# Patient Record
Sex: FEMALE | Race: WHITE | Employment: PART TIME | URBAN - METROPOLITAN AREA
[De-identification: names, ages, dates, MRNs, and addresses within clinical notes are randomized per-mention and may not be internally consistent; named-entity substitution may affect disease eponyms.]

---

## 2017-01-26 ENCOUNTER — HOSPITAL ENCOUNTER (OUTPATIENT)
Dept: MAMMOGRAPHY | Age: 42
Discharge: HOME | End: 2017-01-26
Attending: SPECIALIST
Payer: COMMERCIAL

## 2017-01-26 DIAGNOSIS — Z12.31 ENCOUNTER FOR MAMMOGRAM TO ESTABLISH BASELINE MAMMOGRAM: ICD-10-CM

## 2017-01-26 PROCEDURE — 77067 SCR MAMMO BI INCL CAD: CPT

## 2018-10-23 ENCOUNTER — OFFICE VISIT (OUTPATIENT)
Dept: FAMILY MEDICINE CLINIC | Age: 43
End: 2018-10-23

## 2018-10-23 VITALS
OXYGEN SATURATION: 99 % | RESPIRATION RATE: 16 BRPM | BODY MASS INDEX: 35.32 KG/M2 | HEIGHT: 65 IN | TEMPERATURE: 98.1 F | WEIGHT: 212 LBS | HEART RATE: 81 BPM | SYSTOLIC BLOOD PRESSURE: 128 MMHG | DIASTOLIC BLOOD PRESSURE: 77 MMHG

## 2018-10-23 DIAGNOSIS — Z13.1 SCREENING FOR DIABETES MELLITUS (DM): ICD-10-CM

## 2018-10-23 DIAGNOSIS — Z12.11 COLON CANCER SCREENING: ICD-10-CM

## 2018-10-23 DIAGNOSIS — E55.9 VITAMIN D DEFICIENCY: ICD-10-CM

## 2018-10-23 DIAGNOSIS — Z13.220 ENCOUNTER FOR SCREENING FOR LIPID DISORDER: ICD-10-CM

## 2018-10-23 DIAGNOSIS — Z23 ENCOUNTER FOR IMMUNIZATION: ICD-10-CM

## 2018-10-23 DIAGNOSIS — Z00.00 ROUTINE ADULT HEALTH MAINTENANCE: Primary | ICD-10-CM

## 2018-10-23 PROBLEM — E66.01 SEVERE OBESITY (HCC): Status: ACTIVE | Noted: 2018-10-23

## 2018-10-23 NOTE — PATIENT INSTRUCTIONS
Starting a Weight Loss Plan: Care Instructions  Your Care Instructions    If you are thinking about losing weight, it can be hard to know where to start. Your doctor can help you set up a weight loss plan that best meets your needs. You may want to take a class on nutrition or exercise, or join a weight loss support group. If you have questions about how to make changes to your eating or exercise habits, ask your doctor about seeing a registered dietitian or an exercise specialist.  It can be a big challenge to lose weight. But you do not have to make huge changes at once. Make small changes, and stick with them. When those changes become habit, add a few more changes. If you do not think you are ready to make changes right now, try to pick a date in the future. Make an appointment to see your doctor to discuss whether the time is right for you to start a plan. Follow-up care is a key part of your treatment and safety. Be sure to make and go to all appointments, and call your doctor if you are having problems. It's also a good idea to know your test results and keep a list of the medicines you take. How can you care for yourself at home? · Set realistic goals. Many people expect to lose much more weight than is likely. A weight loss of 5% to 10% of your body weight may be enough to improve your health. · Get family and friends involved to provide support. Talk to them about why you are trying to lose weight, and ask them to help. They can help by participating in exercise and having meals with you, even if they may be eating something different. · Find what works best for you. If you do not have time or do not like to cook, a program that offers meal replacement bars or shakes may be better for you. Or if you like to prepare meals, finding a plan that includes daily menus and recipes may be best.  · Ask your doctor about other health professionals who can help you achieve your weight loss goals. ?  A dietitian can help you make healthy changes in your diet. ? An exercise specialist or  can help you develop a safe and effective exercise program.  ? A counselor or psychiatrist can help you cope with issues such as depression, anxiety, or family problems that can make it hard to focus on weight loss. · Consider joining a support group for people who are trying to lose weight. Your doctor can suggest groups in your area. Where can you learn more? Go to http://lili-will.info/. Enter O006 in the search box to learn more about \"Starting a Weight Loss Plan: Care Instructions. \"  Current as of: June 26, 2018  Content Version: 11.8  © 0866-2316 Healthwise, [employer]. Care instructions adapted under license by [employer] (which disclaims liability or warranty for this information). If you have questions about a medical condition or this instruction, always ask your healthcare professional. [address] any warranty or liability for your use of this information.

## 2018-10-23 NOTE — PROGRESS NOTES
Chief Complaint   Patient presents with    Physical     general physical.  pt is non fasting. Has OB/GYN for well woman exam     \"REVIEWED RECORD IN PREPARATION FOR VISIT AND HAVE OBTAINED THE NECESSARY DOCUMENTATION\"  1. Have you been to the ER, urgent care clinic since your last visit? Hospitalized since your last visit? No    2. Have you seen or consulted any other health care providers outside of the [address] [place] [address] since your last visit? Include any pap smears or colon screening.  No

## 2018-10-24 LAB
25(OH)D3+25(OH)D2 SERPL-MCNC: 18.9 NG/ML (ref 30–100)
ALBUMIN SERPL-MCNC: 4.3 G/DL (ref 3.5–5.5)
ALBUMIN/GLOB SERPL: 1.5 {RATIO} (ref 1.2–2.2)
ALP SERPL-CCNC: 77 IU/L (ref 39–117)
ALT SERPL-CCNC: 16 IU/L (ref 0–32)
AST SERPL-CCNC: 16 IU/L (ref 0–40)
BILIRUB SERPL-MCNC: 0.6 MG/DL (ref 0–1.2)
BUN SERPL-MCNC: 12 MG/DL (ref 6–24)
BUN/CREAT SERPL: 19 (ref 9–23)
CALCIUM SERPL-MCNC: 9.4 MG/DL (ref 8.7–10.2)
CHLORIDE SERPL-SCNC: 101 MMOL/L (ref 96–106)
CHOLEST SERPL-MCNC: 163 MG/DL (ref 100–199)
CO2 SERPL-SCNC: 23 MMOL/L (ref 20–29)
CREAT SERPL-MCNC: 0.64 MG/DL (ref 0.57–1)
ERYTHROCYTE [DISTWIDTH] IN BLOOD BY AUTOMATED COUNT: 13.1 % (ref 12.3–15.4)
GLOBULIN SER CALC-MCNC: 2.8 G/DL (ref 1.5–4.5)
GLUCOSE SERPL-MCNC: 89 MG/DL (ref 65–99)
HCT VFR BLD AUTO: 41 % (ref 34–46.6)
HDLC SERPL-MCNC: 41 MG/DL
HGB BLD-MCNC: 13.1 G/DL (ref 11.1–15.9)
INTERPRETATION, 910389: NORMAL
LDLC SERPL CALC-MCNC: 97 MG/DL (ref 0–99)
MCH RBC QN AUTO: 30.2 PG (ref 26.6–33)
MCHC RBC AUTO-ENTMCNC: 32 G/DL (ref 31.5–35.7)
MCV RBC AUTO: 95 FL (ref 79–97)
PLATELET # BLD AUTO: 292 X10E3/UL (ref 150–379)
POTASSIUM SERPL-SCNC: 4.2 MMOL/L (ref 3.5–5.2)
PROT SERPL-MCNC: 7.1 G/DL (ref 6–8.5)
RBC # BLD AUTO: 4.34 X10E6/UL (ref 3.77–5.28)
SODIUM SERPL-SCNC: 139 MMOL/L (ref 134–144)
TRIGL SERPL-MCNC: 123 MG/DL (ref 0–149)
VLDLC SERPL CALC-MCNC: 25 MG/DL (ref 5–40)
WBC # BLD AUTO: 7.6 X10E3/UL (ref 3.4–10.8)

## 2018-10-24 NOTE — PROGRESS NOTES
Your complete blood count, screening for infection and anemia, is normal. Cholesterol is within normal limits. Your kidney function, liver function, blood sugar and electrolytes are normal. Your vitamin D was low. I recommend a daily Vitamin D3 600 unit supplement. I advise we repeat labs in 1 year. Please let me know if you have any additional questions.     Yanely Alvarenga, NP

## 2018-10-25 ENCOUNTER — DOCUMENTATION ONLY (OUTPATIENT)
Dept: FAMILY MEDICINE CLINIC | Age: 43
End: 2018-10-25

## 2018-10-25 NOTE — PROGRESS NOTES
Medical records for pap and mammo requested from Dr. Theo Camargo fax [phone]. Receipt confirmed.

## 2019-01-29 ENCOUNTER — HOSPITAL ENCOUNTER (OUTPATIENT)
Dept: MAMMOGRAPHY | Age: 44
Discharge: HOME | End: 2019-01-29
Attending: SPECIALIST
Payer: COMMERCIAL

## 2019-01-29 DIAGNOSIS — Z12.39 SCREENING BREAST EXAMINATION: ICD-10-CM

## 2019-01-29 PROCEDURE — 77067 SCR MAMMO BI INCL CAD: CPT

## 2019-07-23 ENCOUNTER — HOSPITAL ENCOUNTER (OUTPATIENT)
Dept: GENERAL RADIOLOGY | Age: 44
Discharge: HOME | End: 2019-07-23
Payer: COMMERCIAL

## 2019-07-23 ENCOUNTER — OFFICE VISIT (OUTPATIENT)
Dept: FAMILY MEDICINE CLINIC | Age: 44
End: 2019-07-23

## 2019-07-23 VITALS
SYSTOLIC BLOOD PRESSURE: 131 MMHG | WEIGHT: 209 LBS | OXYGEN SATURATION: 98 % | DIASTOLIC BLOOD PRESSURE: 89 MMHG | BODY MASS INDEX: 34.82 KG/M2 | HEART RATE: 86 BPM | RESPIRATION RATE: 16 BRPM | HEIGHT: 65 IN | TEMPERATURE: 98.6 F

## 2019-07-23 DIAGNOSIS — Z11.1 SCREENING-PULMONARY TB: Primary | ICD-10-CM

## 2019-07-23 DIAGNOSIS — Z11.1 SCREENING-PULMONARY TB: ICD-10-CM

## 2019-07-23 PROCEDURE — 71046 X-RAY EXAM CHEST 2 VIEWS: CPT

## 2019-07-23 NOTE — PATIENT INSTRUCTIONS
Tuberculosis (Latent TB): Care Instructions  Your Care Instructions    Latent tuberculosis (TB) means that you have bacteria in your body that could cause active TB disease. You can't spread the bacteria to other people at this time. But if your immune system can't keep the bacteria from growing, the disease becomes active. People with weakened immune systems are more likely to develop active TB. With active TB in your lungs, you can spread the disease to others. Active TB is a serious disease. Latent TB doesn't have any symptoms. You may even be surprised that you have it, since you don't feel sick. It's very important to take your antibiotic medicine as your doctor tells you to. This treatment prevents you from getting active TB. It takes a long time to rid your body of TB. You may be taking medicine for 3 to 9 months. During your treatment you'll see your doctor for tests to see how the medicine is working. Your doctor will help guide you through this long process. You may have directly observed therapy (DOT). This means that a health care worker watches when you take your medicine. DOT helps you remember to take your medicine. And it helps you complete your treatment as soon as possible. Follow-up care is a key part of your treatment and safety. Be sure to make and go to all appointments, and call your doctor if you are having problems. It's also a good idea to know your test results and keep a list of the medicines you take. How can you care for yourself at home? · Take your antibiotics as directed. You need to take the full course of antibiotics. · If you get an upset stomach while taking the medicine, ask your doctor if it's okay to take it with food. · If you forget to take your medicine, take the dose as soon as you can if it's the same day. Do not take two doses at the same time. If the day has passed, then take your next scheduled dose.  Tell your doctor or public health worker that you missed a dose so he or she can adjust your treatment schedule. · Avoid drinking alcohol. Alcohol may interact with your medicine and cause side effects. · If you don't have DOT, there are things you can do to help remind you to take your medicine:  ? Take your medicine at the same time every day. ? Set a reminder alarm. ? Use a pillbox. ? Put a reminder note on your mirror or refrigerator. ? Carrie Garcia a calendar after you take your medicine. When should you call for help? Call 911 anytime you think you may need emergency care. For example, call if:    · You have severe trouble breathing.    Call your doctor now or seek immediate medical care if:    · You are short of breath.     · You have a new or worse cough.     · You are dizzy or lightheaded, or you feel like you may faint.     · You have new or worse diarrhea.    Watch closely for changes in your health, and be sure to contact your doctor if:    · You lose weight.     · You have night sweats.     · You do not get better as expected. Where can you learn more? Go to http://lili-will.info/. Enter F130 in the search box to learn more about \"Tuberculosis (Latent TB): Care Instructions. \"  Current as of: July 30, 2018  Content Version: 12.1  © 7221-8909 Healthwise, Incorporated. Care instructions adapted under license by [employer] (which disclaims liability or warranty for this information). If you have questions about a medical condition or this instruction, always ask your healthcare professional. [place] [address] any warranty or liability for your use of this information.

## 2019-07-23 NOTE — PROGRESS NOTES
[facility] Note  Subjective:      Karly Culver is a 53 y.o. female who presents for an acute visit with the following chief complaints. Chief Complaint   Patient presents with    Documentation     pt is starting a new job and tested positive for TB when she was a child. needs CXR for employer. 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 Given history of latent TB via positive PPD as a child, she is required to update chest x-ray. Reports treatment for latent TB with INH for 9 months -1 year. Denies chronic cough, night sweats, weight loss. No Known Allergies    ROS:   Complete review of systems was reviewed with pertinent information listed in HPI. Review of Systems   Constitutional: Negative for chills, diaphoresis, fever, malaise/fatigue and weight loss. HENT: Negative for congestion, ear pain, hearing loss, sinus pain, sore throat and tinnitus. Eyes: Negative for blurred vision. Respiratory: Negative for cough, shortness of breath and wheezing. Cardiovascular: Negative for chest pain and palpitations. Gastrointestinal: Negative for abdominal pain, diarrhea, heartburn, nausea and vomiting. Genitourinary: Negative for dysuria. Musculoskeletal: Negative for myalgias. Skin: Negative for rash. Neurological: Negative for dizziness and headaches. Objective:     Visit Vitals  /89 (BP 1 Location: Left arm, BP Patient Position: Sitting)   Pulse 86   Temp 98.6 °F (37 °C) (Oral)   Resp 16   Ht 5' 5\" (1.651 m)   Wt 209 lb (94.8 kg)   SpO2 98%   BMI 34.78 kg/m²       Vitals and Nurse Documentation reviewed. Physical Exam   Constitutional: She is oriented to person, place, and time and well-developed, well-nourished, and in no distress. HENT:   Head: Normocephalic and atraumatic. Cardiovascular: Normal rate, regular rhythm, normal heart sounds and intact distal pulses. Exam reveals no gallop and no friction rub.    No murmur heard.  Pulmonary/Chest: Effort normal and breath sounds normal. No respiratory distress. She has no wheezes. She has no rales. She exhibits no tenderness. Musculoskeletal: She exhibits no edema. Neurological: She is alert and oriented to person, place, and time. Gait normal.   Skin: Skin is warm and dry. Psychiatric: Mood, memory, affect and judgment normal.   Nursing note and vitals reviewed. Assessment/Plan:     Diagnoses and all orders for this visit:    1. Screening-pulmonary TB: History of latent TB s/p INH therapy for 9 months -1 year. No signs of active disease. Update chest x-ray today. -     XR CHEST PA LAT; Future    Return for CPE in 3-4 months. Follow-up and Dispositions    · Return if symptoms worsen or fail to improve.          Discussed expected course/resolution/complications of diagnosis in detail with patient.    Medication risks/benefits/costs/interactions/alternatives discussed with patient.    Pt was given an after visit summary which includes diagnoses, current medications & vitals.  Pt expressed understanding with the diagnosis and plan

## 2019-07-23 NOTE — PROGRESS NOTES
Chief Complaint   Patient presents with    Documentation     pt is starting a new job and tested positive for TB when she was a child. needs CXR for employer. \"REVIEWED RECORD IN PREPARATION FOR VISIT AND HAVE OBTAINED THE NECESSARY DOCUMENTATION\"  1. Have you been to the ER, urgent care clinic since your last visit? Hospitalized since your last visit? No    2. Have you seen or consulted any other health care providers outside of the [address] [place] [address] since your last visit? Include any pap smears or colon screening.  No

## 2019-07-24 DIAGNOSIS — Z01.84 IMMUNITY STATUS TESTING: Primary | ICD-10-CM

## 2019-07-24 DIAGNOSIS — D16.02 ENCHONDROMA OF HUMERUS, LEFT: ICD-10-CM

## 2019-07-24 NOTE — PROGRESS NOTES
I spoke with patient (name and  verified), discussed results below. No acute cardiopulmonary process. No evidence of tuberculosis. Stable enchondroma, benign bone tumor, in the left proximal humerus, no change since . Advised follow-up with ortho for further evaluation of the finding and she is agreeable. Referral infomation was provided. She states she needs proof of hepatitis B immunity for work. Titers ordered and she will return for labs. Yecenia Garza, can you also provide letter of results for her to present to work for latent TB screening. Thank you!

## 2019-07-26 LAB
HBV SURFACE AB SER-ACNC: 16.9 MIU/ML
MEV IGG SER IA-ACNC: >300 AU/ML
MUV IGG SER IA-ACNC: 133 AU/ML
RUBV IGG SERPL IA-ACNC: 7.87 INDEX

## 2020-01-01 NOTE — PROGRESS NOTES
Called patient and results reviewed.
She has immunity to hepatitis B. She has immunity to measles/mumps/rubella.
Statement Selected

## 2020-03-03 ENCOUNTER — HOSPITAL ENCOUNTER (OUTPATIENT)
Dept: MAMMOGRAPHY | Age: 45
Discharge: HOME | End: 2020-03-03
Attending: SPECIALIST
Payer: COMMERCIAL

## 2020-03-03 DIAGNOSIS — Z12.31 ENCOUNTER FOR MAMMOGRAM TO ESTABLISH BASELINE MAMMOGRAM: ICD-10-CM

## 2020-03-03 PROCEDURE — 77067 SCR MAMMO BI INCL CAD: CPT

## 2021-10-29 ENCOUNTER — TELEPHONE (OUTPATIENT)
Dept: FAMILY MEDICINE CLINIC | Age: 46
End: 2021-10-29

## 2021-10-29 NOTE — TELEPHONE ENCOUNTER
Pt's  called and said she is experiencing Lt arm numbness and BP elevated. She is currently at work. Also , said Dr. 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 Nurse MARA notified.      BCB# [phone]

## 2021-10-29 NOTE — TELEPHONE ENCOUNTER
Spoke to  who is a pt here. He wanted to make an appt for his wife. He states that she hadn't been feeling well and she went to [facility].  They tested her to covid and it was negative. Per spouse he said that [facility] told her that her BP was a little elevated and he wanted to f/u on that. He wanted to get pt in with Dr Serafin Monterroso and I explained that Dr Serafin Monterroso' panels are full right now. I scheduled her to see Michael Daniels for a new to provider appt 11/11/21 @ 3:00. I let him know that if pt has any CP, nausea, SOB that pt needs to go to ER. He states understanding.

## 2021-11-11 ENCOUNTER — OFFICE VISIT (OUTPATIENT)
Dept: FAMILY MEDICINE CLINIC | Age: 46
End: 2021-11-11
Payer: COMMERCIAL

## 2021-11-11 VITALS
TEMPERATURE: 98.2 F | RESPIRATION RATE: 16 BRPM | DIASTOLIC BLOOD PRESSURE: 98 MMHG | OXYGEN SATURATION: 98 % | HEIGHT: 65 IN | HEART RATE: 101 BPM | BODY MASS INDEX: 35.32 KG/M2 | WEIGHT: 212 LBS | SYSTOLIC BLOOD PRESSURE: 156 MMHG

## 2021-11-11 DIAGNOSIS — R03.0 ELEVATED BLOOD PRESSURE READING WITHOUT DIAGNOSIS OF HYPERTENSION: Primary | ICD-10-CM

## 2021-11-11 DIAGNOSIS — Z23 NEEDS FLU SHOT: ICD-10-CM

## 2021-11-11 PROCEDURE — 99212 OFFICE O/P EST SF 10 MIN: CPT | Performed by: NURSE PRACTITIONER

## 2021-11-11 PROCEDURE — 90471 IMMUNIZATION ADMIN: CPT | Performed by: NURSE PRACTITIONER

## 2021-11-11 PROCEDURE — 90686 IIV4 VACC NO PRSV 0.5 ML IM: CPT | Performed by: NURSE PRACTITIONER

## 2021-11-11 RX ORDER — HYDROCHLOROTHIAZIDE 25 MG/1
12.5 TABLET ORAL DAILY
Qty: 30 TABLET | Refills: 1 | Status: SHIPPED | OUTPATIENT
Start: 2021-11-11

## 2021-11-11 NOTE — PROGRESS NOTES
[facility] Note     Amado Ortega (: 1975) is a 48 y.o. female, established patient, here for evaluation of the following chief complaint(s):  Establish Care and Blood Pressure Check       ASSESSMENT/PLAN:  1. Elevated blood pressure reading without diagnosis of hypertension  -     METABOLIC PANEL, COMPREHENSIVE; Future  -     TSH 3RD GENERATION; Future  -     T4 (THYROXINE); Future  - Counseling provided on weight loss and exercise as a part of a healthy lifestyle    2. Needs flu shot  -     INFLUENZA VIRUS VAC QUAD,SPLIT,PRESV FREE SYRINGE IM      Return in about 1 month (around 2021) for Regular Follow-up. SUBJECTIVE/OBJECTIVE:    Amado Ortega is a 48 y.o. female seen today for elevated blood pressure. Cardiovascular Review:  She has hypertension. Diet and Lifestyle: generally follows a low fat low cholesterol diet, exercises sporadically, nonsmoker  Home BP Monitoring: is not well controlled at home, ranging 140-160's. Pertinent ROS: taking medications as instructed, no medication side effects noted, no TIA's, no chest pain on exertion, no dyspnea on exertion, no swelling of ankles, no palpitations. Review of Systems   Constitutional: Negative. HENT: Negative. Respiratory: Negative. Cardiovascular: Negative. Gastrointestinal: Negative. Genitourinary: Negative. Musculoskeletal: Negative. Skin: Negative. Neurological: Negative. Psychiatric/Behavioral: Negative.         Wt Readings from Last 3 Encounters:   21 212 lb (96.2 kg)   19 209 lb (94.8 kg)   10/23/18 212 lb (96.2 kg)     Temp Readings from Last 3 Encounters:   21 98.2 °F (36.8 °C) (Temporal)   19 98.6 °F (37 °C) (Oral)   10/23/18 98.1 °F (36.7 °C) (Oral)     BP Readings from Last 3 Encounters:   21 (!) 156/98   19 131/89   10/23/18 128/77     Pulse Readings from Last 3 Encounters:   21 (!) 101   19 86   10/23/18 81 General appearance - Alert, NAD. Head: Atraumatic. Normocephalic. No lymphadenopathy  Eyes:  Sclera anicteric. Ears: Hearing grossly normal.    Nose: Nares patent, no polyps  Throat: pharynx clear, no exudates. Respiratory - LCTAB. No wheeze/rale/rhonchi  Heart - Normal rate, regular rhythm. No M/R/G  Abdomen - Non distended. Neurological - No focal deficits. Speech normal.   Musculoskeletal - Normal ROM, Gait normal.  Extremities - No LE edema. Distal pulses intact  Skin - normal coloration and normal turgor. No cyanosis, no rash. Treatment risks/benefits/costs/interactions/alternatives discussed with patient. Advised patient to call back or return to office if symptoms worsen/change/persist. If patient cannot reach us or should anything more severe/urgent arise he/she should proceed directly to the nearest emergency department. Discussed expected course/resolution/complications of diagnosis in detail with patient. Patient expressed understanding with the diagnosis and plan. An electronic signature was used to authenticate this note.   -- Paco Saavedra NP

## 2021-11-11 NOTE — PATIENT INSTRUCTIONS
Elevated Blood Pressure: Care Instructions  Your Care Instructions    Blood pressure is a measure of how hard the blood pushes against the walls of your arteries. It's normal for blood pressure to go up and down throughout the day. But if it stays up over time, you have high blood pressure. Two numbers tell you your blood pressure. The first number is the systolic pressure. It shows how hard the blood pushes when your heart is pumping. The second number is the diastolic pressure. It shows how hard the blood pushes between heartbeats, when your heart is relaxed and filling with blood. An ideal blood pressure in adults is less than 120/80 (say \"120 over 80\"). High blood pressure is 140/90 or higher. You have high blood pressure if your top number is 140 or higher or your bottom number is 90 or higher, or both. The main test for high blood pressure is simple, fast, and painless. To diagnose high blood pressure, your doctor will test your blood pressure at different times. After testing your blood pressure, your doctor may ask you to test it again when you are home. If you are diagnosed with high blood pressure, you can work with your doctor to make a long-term plan to manage it. Follow-up care is a key part of your treatment and safety. Be sure to make and go to all appointments, and call your doctor if you are having problems. It's also a good idea to know your test results and keep a list of the medicines you take. How can you care for yourself at home? · Do not smoke. Smoking increases your risk for heart attack and stroke. If you need help quitting, talk to your doctor about stop-smoking programs and medicines. These can increase your chances of quitting for good. · Stay at a healthy weight. · Try to limit how much sodium you eat to less than 2,300 milligrams (mg) a day. Your doctor may ask you to try to eat less than 1,500 mg a day. · Be physically active.  Get at least 30 minutes of exercise on most days of the week. Walking is a good choice. You also may want to do other activities, such as running, swimming, cycling, or playing tennis or team sports. · Avoid or limit alcohol. Talk to your doctor about whether you can drink any alcohol. · Eat plenty of fruits, vegetables, and low-fat dairy products. Eat less saturated and total fats. · Learn how to check your blood pressure at home. When should you call for help? Call your doctor now or seek immediate medical care if:  ? · Your blood pressure is much higher than normal (such as 180/110 or higher). ? · You think high blood pressure is causing symptoms such as:  ¨ Severe headache. ¨ Blurry vision. ? Watch closely for changes in your health, and be sure to contact your doctor if:  ? · You do not get better as expected. Where can you learn more? Go to http://www.gray.com/. Enter G258 in the search box to learn more about \"Elevated Blood Pressure: Care Instructions. \"  Current as of: September 21, 2016  Content Version: 11.4  © 1344-6926 [employer]. Care instructions adapted under license by [employer] (which disclaims liability or warranty for this information). If you have questions about a medical condition or this instruction, always ask your healthcare professional. [address] any warranty or liability for your use of this information.

## 2021-11-11 NOTE — PROGRESS NOTES
Chief Complaint   Patient presents with   [facility]    Blood Pressure Check         1. \"Have you been to the ER, urgent care clinic since your last visit? Hospitalized since your last visit? \" No    2. \"Have you seen or consulted any other health care providers outside of the [address] [place] [address] since your last visit? \" Yes When: 10/21 Where: OBGYN Reason for visit: annual     3. For patients over 45: Has the patient had a colonoscopy? No scheduled    If the patient is female:    4. For patients over 40: Has the patient had a mammogram? Yes, HM satisfied with blue hyperlink    5. For patients over 21: Has the patient had a pap smear? Yes, HM satisfied with blue hyperlink   Have you had the covid vaccine. .. when    3 most recent PHQ Screens 11/11/2021   Little interest or pleasure in doing things Not at all   Feeling down, depressed, irritable, or hopeless Not at all   Total Score PHQ 2 0       Health Maintenance Due   Topic Date Due    Hepatitis C Screening  Never done    COVID-19 Vaccine (1) Never done    Colorectal Cancer Screening Combo  Never done    DTaP/Tdap/Td series (2 - Td or Tdap) 07/11/2021    Flu Vaccine (1) 09/01/2021     Alejandra Gaspar is a 48 y.o. female  who presents for flu immunization. she denies any symptoms , reactions or allergies that would exclude them from being immunized today. Risks and adverse reactions were discussed and the VIS was given to them. All questions were addressed. she was observed for 10 min post injection. There were no reactions observed.     Vijaya Pozo

## 2021-11-12 LAB
ALBUMIN SERPL-MCNC: 4.3 G/DL (ref 3.5–5)
ALBUMIN/GLOB SERPL: 1.2 {RATIO} (ref 1.1–2.2)
ALP SERPL-CCNC: 75 U/L (ref 45–117)
ALT SERPL-CCNC: 21 U/L (ref 12–78)
ANION GAP SERPL CALC-SCNC: 4 MMOL/L (ref 5–15)
AST SERPL-CCNC: 12 U/L (ref 15–37)
BILIRUB SERPL-MCNC: 0.5 MG/DL (ref 0.2–1)
BUN SERPL-MCNC: 14 MG/DL (ref 6–20)
BUN/CREAT SERPL: 17 (ref 12–20)
CALCIUM SERPL-MCNC: 9.7 MG/DL (ref 8.5–10.1)
CHLORIDE SERPL-SCNC: 103 MMOL/L (ref 97–108)
CO2 SERPL-SCNC: 28 MMOL/L (ref 21–32)
CREAT SERPL-MCNC: 0.82 MG/DL (ref 0.55–1.02)
GLOBULIN SER CALC-MCNC: 3.5 G/DL (ref 2–4)
GLUCOSE SERPL-MCNC: 106 MG/DL (ref 65–100)
POTASSIUM SERPL-SCNC: 4.1 MMOL/L (ref 3.5–5.1)
PROT SERPL-MCNC: 7.8 G/DL (ref 6.4–8.2)
SODIUM SERPL-SCNC: 135 MMOL/L (ref 136–145)
T4 SERPL-MCNC: 10.1 UG/DL (ref 4.8–13.9)
TSH SERPL DL<=0.05 MIU/L-ACNC: 0.81 UIU/ML (ref 0.36–3.74)

## 2021-11-15 ENCOUNTER — TELEPHONE (OUTPATIENT)
Dept: FAMILY MEDICINE CLINIC | Age: 46
End: 2021-11-15

## 2021-11-15 NOTE — TELEPHONE ENCOUNTER
Faxed and confirmed    ----- Message from Yared Mancuso NP sent at 11/14/2021  1:34 PM EST -----  Regarding: records request  GYN - [address] [facility] for mammo and gyn records

## 2021-11-30 ENCOUNTER — TELEPHONE (OUTPATIENT)
Dept: FAMILY MEDICINE CLINIC | Age: 46
End: 2021-11-30

## 2021-11-30 NOTE — TELEPHONE ENCOUNTER
Yeimy Yamilet (Self) [phone] (W)     Pt would like to report her BP reading to SARKIS Fontenot. Pt was in a seating position each time.     11/12-  L Arm in AM: 119/80  R Arm in AM: 142/84  L Arm in PM: 143/88  R Arm in PM: 131/82    11/13-  L Arm in AM: 134/84  R Arm in AM: 126/79  L Arm in PM: 123/87  R Arm in PM: 121/88    11/14-  L Arm in AM: 123/81  R Arm in AM: 138/82  L Arm in PM: 118/66  R Arm in PM: 118/84    11/15-  L Arm in AM: 124/78  R Arm in AM:131/78  L Arm in PM: 121/86  R Arm in PM: 124/78    11/16-  L Arm in AM: 121/85  R Arm in AM:118/79  L Arm in PM: 121/91  R Arm in PM: 122/82

## 2021-12-06 ENCOUNTER — TELEPHONE (OUTPATIENT)
Dept: FAMILY MEDICINE CLINIC | Age: 46
End: 2021-12-06

## 2021-12-08 NOTE — TELEPHONE ENCOUNTER
Called patient. Two patient identifiers verified. Informed pt NP Po reviewed her BP readings and said they were good so no medication changes were recommended at this time. Pt verbalized understanding.

## 2021-12-16 ENCOUNTER — OFFICE VISIT (OUTPATIENT)
Dept: FAMILY MEDICINE CLINIC | Age: 46
End: 2021-12-16
Payer: COMMERCIAL

## 2021-12-16 VITALS
BODY MASS INDEX: 33.45 KG/M2 | RESPIRATION RATE: 16 BRPM | WEIGHT: 200.8 LBS | HEIGHT: 65 IN | SYSTOLIC BLOOD PRESSURE: 112 MMHG | OXYGEN SATURATION: 98 % | TEMPERATURE: 97.7 F | HEART RATE: 92 BPM | DIASTOLIC BLOOD PRESSURE: 78 MMHG

## 2021-12-16 DIAGNOSIS — R07.9 CHEST PAIN, UNSPECIFIED TYPE: Primary | ICD-10-CM

## 2021-12-16 DIAGNOSIS — Z11.59 NEED FOR HEPATITIS C SCREENING TEST: ICD-10-CM

## 2021-12-16 DIAGNOSIS — I10 PRIMARY HYPERTENSION: ICD-10-CM

## 2021-12-16 DIAGNOSIS — Z13.6 SCREENING FOR CARDIOVASCULAR CONDITION: ICD-10-CM

## 2021-12-16 PROCEDURE — 99213 OFFICE O/P EST LOW 20 MIN: CPT | Performed by: NURSE PRACTITIONER

## 2021-12-16 RX ORDER — SOD SULF/POT CHLORIDE/MAG SULF 1.479 G
TABLET ORAL
COMMUNITY
Start: 2021-12-15

## 2021-12-16 NOTE — PROGRESS NOTES
[facility] Note     Amado Ortega (: 1975) is a 48 y.o. female, established patient, here for evaluation of the following chief complaint(s):  Hypertension (4 week f/u)       ASSESSMENT/PLAN:  1. Chest pain, unspecified type  -     Asymptomatic currently  - REFERRAL TO CARDIOLOGY  -     METABOLIC PANEL, BASIC; Future  -     LIPID PANEL; Future    2. Primary hypertension  -     METABOLIC PANEL, BASIC; Future  -Diet and lifestyle modification encouraged for weight loss and chronic disease prevention/ management  - Consideration towards discontinuing HCTZ if BP remains low normal. Patient will provide additional readings for review    3. Screening for cardiovascular condition  -     LIPID PANEL; Future    4. Need for hepatitis C screening test  -     HEPATITIS C AB; Future      Return in about 1 year (around 2022), or if symptoms worsen or fail to improve. SUBJECTIVE/OBJECTIVE:    Amado Ortega is a 48 y.o. female seen today for HTN and CP. Cardiovascular Review:  She has hypertension. Diet and Lifestyle: generally follows a low fat low cholesterol diet, exercises sporadically  Home BP Monitoring: is not measured at home. Pertinent ROS: taking medications as instructed, no medication side effects noted, no TIA's, no chest pain on exertion, no dyspnea on exertion, no swelling of ankles. Review of Systems   Constitutional: Negative. HENT: Negative. Eyes: Negative. Respiratory: Negative for cough and shortness of breath. Cardiovascular: Positive for chest pain. Negative for palpitations and leg swelling. Gastrointestinal: Negative. Endocrine: Negative. Genitourinary: Negative. Musculoskeletal: Negative. Neurological: Negative. Psychiatric/Behavioral: Negative.         Wt Readings from Last 3 Encounters:   21 200 lb 12.8 oz (91.1 kg)   21 212 lb (96.2 kg)   19 209 lb (94.8 kg)     Temp Readings from Last 3 Encounters:   12/16/21 97.7 °F (36.5 °C) (Temporal)   11/11/21 98.2 °F (36.8 °C) (Temporal)   07/23/19 98.6 °F (37 °C) (Oral)     BP Readings from Last 3 Encounters:   12/16/21 112/78   11/11/21 (!) 156/98   07/23/19 131/89     Pulse Readings from Last 3 Encounters:   12/16/21 92   11/11/21 (!) 101   07/23/19 86           PHYSICAL EXAMINATION:       General: Alert, cooperative, no distress  Respiratory: Breathing comfortably, in no acute respiratory distress. Clear to auscultation bilaterally. Cardiovascular: Regular rate and rhythm, S1, S2 normal, no murmur, click, rub or gallop. Extremities: no edema. Abdomen: Soft, non-tender, not distended. Bowel sounds normal. No masses or organomegaly. MSK: Extremities normal appearing, atraumatic, no effusion. Gait steady and unassisted. Skin: Skin color, texture, turgor normal. No rashes or lesions on exposed skin. Neurologic: A/Ox3  Psychiatric: Normal affect. Mood euthymic. Thoughts logical. Speech volume and speed normal            Treatment risks/benefits/costs/interactions/alternatives discussed with patient. Advised patient to call back or return to office if symptoms worsen/change/persist. If patient cannot reach us or should anything more severe/urgent arise he/she should proceed directly to the nearest emergency department. Discussed expected course/resolution/complications of diagnosis in detail with patient. Patient expressed understanding with the diagnosis and plan. An electronic signature was used to authenticate this note.   -- Nimco Lim NP

## 2021-12-16 NOTE — PATIENT INSTRUCTIONS

## 2021-12-16 NOTE — PROGRESS NOTES
Identified pt with two pt identifiers(name and ). Reviewed record in preparation for visit and have obtained necessary documentation. Chief Complaint   Patient presents with    Hypertension     4 week f/u        Vitals:    21 1227   Weight: 200 lb 12.8 oz (91.1 kg)   Height: 5' 5\" (1.651 m)   PainSc:   0 - No pain       Health Maintenance Due   Topic    Hepatitis C Screening     Colorectal Cancer Screening Combo     DTaP/Tdap/Td series (2 - Td or Tdap)    COVID-19 Vaccine (3 - Booster for Moderna series)       Coordination of Care Questionnaire:  :   1) Have you been to an emergency room, urgent care, or hospitalized since your last visit? If yes, where when, and reason for visit? no       2. Have seen or consulted any other health care provider since your last visit? If yes, where when, and reason for visit? NO    3. For patients over 45: Has the patient had a colonoscopy? No     If the patient is female:    4. For patients over 40: Has the patient had a mammogram? Yes,  satisfied with blue hyperlink    5. For patients over 21: Has the patient had a pap smear? Yes, HM satisfied with blue hyperlink    Patient is accompanied by self I have received verbal consent from [place] to discuss any/all medical information while they are present in the room.

## 2021-12-17 LAB
ANION GAP SERPL CALC-SCNC: 7 MMOL/L (ref 5–15)
BUN SERPL-MCNC: 17 MG/DL (ref 6–20)
BUN/CREAT SERPL: 22 (ref 12–20)
CALCIUM SERPL-MCNC: 9.8 MG/DL (ref 8.5–10.1)
CHLORIDE SERPL-SCNC: 102 MMOL/L (ref 97–108)
CHOLEST SERPL-MCNC: 174 MG/DL
CO2 SERPL-SCNC: 27 MMOL/L (ref 21–32)
CREAT SERPL-MCNC: 0.79 MG/DL (ref 0.55–1.02)
GLUCOSE SERPL-MCNC: 96 MG/DL (ref 65–100)
HCV AB SERPL QL IA: NONREACTIVE
HDLC SERPL-MCNC: 45 MG/DL
HDLC SERPL: 3.9 {RATIO} (ref 0–5)
LDLC SERPL CALC-MCNC: 103 MG/DL (ref 0–100)
POTASSIUM SERPL-SCNC: 4 MMOL/L (ref 3.5–5.1)
SODIUM SERPL-SCNC: 136 MMOL/L (ref 136–145)
TRIGL SERPL-MCNC: 130 MG/DL (ref ?–150)
VLDLC SERPL CALC-MCNC: 26 MG/DL

## 2022-03-18 PROBLEM — E66.01 SEVERE OBESITY (HCC): Status: ACTIVE | Noted: 2018-10-23

## 2022-03-20 PROBLEM — I10 PRIMARY HYPERTENSION: Status: ACTIVE | Noted: 2021-12-16

## 2023-05-10 RX ORDER — HYDROCHLOROTHIAZIDE 25 MG/1
0.5 TABLET ORAL DAILY
COMMUNITY
Start: 2021-11-11

## 2023-05-10 RX ORDER — SOD SULF/POT CHLORIDE/MAG SULF 1.479 G
TABLET ORAL
COMMUNITY
Start: 2021-12-15

## 2024-11-23 LAB — HBA1C MFR BLD HPLC: 5.4 %

## 2025-01-31 ENCOUNTER — OFFICE VISIT (OUTPATIENT)
Facility: CLINIC | Age: 50
End: 2025-01-31
Payer: COMMERCIAL

## 2025-01-31 VITALS
HEART RATE: 85 BPM | WEIGHT: 209.4 LBS | DIASTOLIC BLOOD PRESSURE: 90 MMHG | OXYGEN SATURATION: 99 % | RESPIRATION RATE: 16 BRPM | HEIGHT: 65 IN | BODY MASS INDEX: 34.89 KG/M2 | SYSTOLIC BLOOD PRESSURE: 126 MMHG

## 2025-01-31 DIAGNOSIS — R00.9 ABNORMAL HEART RATE: ICD-10-CM

## 2025-01-31 DIAGNOSIS — R76.11 PPD POSITIVE: ICD-10-CM

## 2025-01-31 DIAGNOSIS — H93.12 TINNITUS, LEFT: ICD-10-CM

## 2025-01-31 DIAGNOSIS — E66.811 OBESITY (BMI 30.0-34.9): ICD-10-CM

## 2025-01-31 DIAGNOSIS — I10 PRIMARY HYPERTENSION: ICD-10-CM

## 2025-01-31 DIAGNOSIS — Z00.00 ANNUAL PHYSICAL EXAM: Primary | ICD-10-CM

## 2025-01-31 LAB
ALBUMIN SERPL-MCNC: 4.1 G/DL (ref 3.5–5)
ALBUMIN/GLOB SERPL: 1.2 (ref 1.1–2.2)
ALP SERPL-CCNC: 72 U/L (ref 45–117)
ALT SERPL-CCNC: 21 U/L (ref 12–78)
ANION GAP SERPL CALC-SCNC: 6 MMOL/L (ref 2–12)
APPEARANCE UR: CLEAR
AST SERPL-CCNC: 18 U/L (ref 15–37)
BACTERIA URNS QL MICRO: NEGATIVE /HPF
BASOPHILS # BLD: 0.03 K/UL (ref 0–0.1)
BASOPHILS NFR BLD: 0.3 % (ref 0–1)
BILIRUB SERPL-MCNC: 0.8 MG/DL (ref 0.2–1)
BILIRUB UR QL: NEGATIVE
BUN SERPL-MCNC: 14 MG/DL (ref 6–20)
BUN/CREAT SERPL: 20 (ref 12–20)
CALCIUM SERPL-MCNC: 9.8 MG/DL (ref 8.5–10.1)
CHLORIDE SERPL-SCNC: 100 MMOL/L (ref 97–108)
CHOLEST SERPL-MCNC: 192 MG/DL
CO2 SERPL-SCNC: 29 MMOL/L (ref 21–32)
COLOR UR: NORMAL
CREAT SERPL-MCNC: 0.7 MG/DL (ref 0.55–1.02)
DIFFERENTIAL METHOD BLD: NORMAL
EOSINOPHIL # BLD: 0.08 K/UL (ref 0–0.4)
EOSINOPHIL NFR BLD: 0.8 % (ref 0–7)
EPITH CASTS URNS QL MICRO: NORMAL /LPF
ERYTHROCYTE [DISTWIDTH] IN BLOOD BY AUTOMATED COUNT: 12.3 % (ref 11.5–14.5)
EST. AVERAGE GLUCOSE BLD GHB EST-MCNC: 108 MG/DL
GLOBULIN SER CALC-MCNC: 3.4 G/DL (ref 2–4)
GLUCOSE SERPL-MCNC: 94 MG/DL (ref 65–100)
GLUCOSE UR STRIP.AUTO-MCNC: NEGATIVE MG/DL
HBA1C MFR BLD: 5.4 % (ref 4–5.6)
HCT VFR BLD AUTO: 41.3 % (ref 35–47)
HDLC SERPL-MCNC: 53 MG/DL
HDLC SERPL: 3.6 (ref 0–5)
HGB BLD-MCNC: 14 G/DL (ref 11.5–16)
HGB UR QL STRIP: NEGATIVE
HYALINE CASTS URNS QL MICRO: NORMAL /LPF (ref 0–5)
IMM GRANULOCYTES # BLD AUTO: 0.03 K/UL (ref 0–0.04)
IMM GRANULOCYTES NFR BLD AUTO: 0.3 % (ref 0–0.5)
KETONES UR QL STRIP.AUTO: NEGATIVE MG/DL
LDLC SERPL CALC-MCNC: 112.6 MG/DL (ref 0–100)
LEUKOCYTE ESTERASE UR QL STRIP.AUTO: NEGATIVE
LYMPHOCYTES # BLD: 2 K/UL (ref 0.8–3.5)
LYMPHOCYTES NFR BLD: 20.5 % (ref 12–49)
MCH RBC QN AUTO: 30.3 PG (ref 26–34)
MCHC RBC AUTO-ENTMCNC: 33.9 G/DL (ref 30–36.5)
MCV RBC AUTO: 89.4 FL (ref 80–99)
MONOCYTES # BLD: 0.68 K/UL (ref 0–1)
MONOCYTES NFR BLD: 7 % (ref 5–13)
NEUTS SEG # BLD: 6.92 K/UL (ref 1.8–8)
NEUTS SEG NFR BLD: 71.1 % (ref 32–75)
NITRITE UR QL STRIP.AUTO: NEGATIVE
NRBC # BLD: 0 K/UL (ref 0–0.01)
NRBC BLD-RTO: 0 PER 100 WBC
PH UR STRIP: 6 (ref 5–8)
PLATELET # BLD AUTO: 310 K/UL (ref 150–400)
PMV BLD AUTO: 10.5 FL (ref 8.9–12.9)
POTASSIUM SERPL-SCNC: 3.8 MMOL/L (ref 3.5–5.1)
PROT SERPL-MCNC: 7.5 G/DL (ref 6.4–8.2)
PROT UR STRIP-MCNC: NEGATIVE MG/DL
RBC # BLD AUTO: 4.62 M/UL (ref 3.8–5.2)
RBC #/AREA URNS HPF: NORMAL /HPF (ref 0–5)
SODIUM SERPL-SCNC: 135 MMOL/L (ref 136–145)
SP GR UR REFRACTOMETRY: 1.01 (ref 1–1.03)
TRIGL SERPL-MCNC: 132 MG/DL
TSH SERPL DL<=0.05 MIU/L-ACNC: 0.98 UIU/ML (ref 0.36–3.74)
URINE CULTURE IF INDICATED: NORMAL
UROBILINOGEN UR QL STRIP.AUTO: 0.2 EU/DL (ref 0.2–1)
VLDLC SERPL CALC-MCNC: 26.4 MG/DL
WBC # BLD AUTO: 9.7 K/UL (ref 3.6–11)
WBC URNS QL MICRO: NORMAL /HPF (ref 0–4)

## 2025-01-31 PROCEDURE — 93000 ELECTROCARDIOGRAM COMPLETE: CPT | Performed by: INTERNAL MEDICINE

## 2025-01-31 PROCEDURE — 3074F SYST BP LT 130 MM HG: CPT | Performed by: INTERNAL MEDICINE

## 2025-01-31 PROCEDURE — 3080F DIAST BP >= 90 MM HG: CPT | Performed by: INTERNAL MEDICINE

## 2025-01-31 PROCEDURE — 99386 PREV VISIT NEW AGE 40-64: CPT | Performed by: INTERNAL MEDICINE

## 2025-01-31 RX ORDER — PHENTERMINE HYDROCHLORIDE 37.5 MG/1
37.5 TABLET ORAL DAILY
COMMUNITY
Start: 2025-01-05 | End: 2025-01-31

## 2025-01-31 SDOH — ECONOMIC STABILITY: FOOD INSECURITY: WITHIN THE PAST 12 MONTHS, YOU WORRIED THAT YOUR FOOD WOULD RUN OUT BEFORE YOU GOT MONEY TO BUY MORE.: NEVER TRUE

## 2025-01-31 SDOH — ECONOMIC STABILITY: FOOD INSECURITY: WITHIN THE PAST 12 MONTHS, THE FOOD YOU BOUGHT JUST DIDN'T LAST AND YOU DIDN'T HAVE MONEY TO GET MORE.: NEVER TRUE

## 2025-01-31 ASSESSMENT — ANXIETY QUESTIONNAIRES
3. WORRYING TOO MUCH ABOUT DIFFERENT THINGS: NOT AT ALL
GAD7 TOTAL SCORE: 0
4. TROUBLE RELAXING: NOT AT ALL
6. BECOMING EASILY ANNOYED OR IRRITABLE: NOT AT ALL
5. BEING SO RESTLESS THAT IT IS HARD TO SIT STILL: NOT AT ALL
7. FEELING AFRAID AS IF SOMETHING AWFUL MIGHT HAPPEN: NOT AT ALL
2. NOT BEING ABLE TO STOP OR CONTROL WORRYING: NOT AT ALL
1. FEELING NERVOUS, ANXIOUS, OR ON EDGE: NOT AT ALL
IF YOU CHECKED OFF ANY PROBLEMS ON THIS QUESTIONNAIRE, HOW DIFFICULT HAVE THESE PROBLEMS MADE IT FOR YOU TO DO YOUR WORK, TAKE CARE OF THINGS AT HOME, OR GET ALONG WITH OTHER PEOPLE: NOT DIFFICULT AT ALL

## 2025-01-31 ASSESSMENT — PATIENT HEALTH QUESTIONNAIRE - PHQ9
1. LITTLE INTEREST OR PLEASURE IN DOING THINGS: NOT AT ALL
SUM OF ALL RESPONSES TO PHQ QUESTIONS 1-9: 0
SUM OF ALL RESPONSES TO PHQ9 QUESTIONS 1 & 2: 0
SUM OF ALL RESPONSES TO PHQ QUESTIONS 1-9: 0
2. FEELING DOWN, DEPRESSED OR HOPELESS: NOT AT ALL

## 2025-01-31 NOTE — PROGRESS NOTES
Nohemy Lerner is a 50 y.o. year old female seen in clinic today for establishing for primary care  Chief Complaint   Patient presents with    New Patient     Previous PCP: Ricardo Alfaro     Hypertension    Weight Management    Tinnitus       She has had trouble losing weight.  She has been on phentermine.  It is not working.  She does eat healthy but is not exercising.  She has run a marathon in the past.  History of hypertension for which she has been on hydrochlorothiazide currently prescribed by her gynecologist.  She gives a history of tinnitus in the left ear.  She notices it more in the morning when she is by herself.  She denies any hearing loss or vertigo.  History of numbness in the hands and feet few years back for which she also had an MRI of the brain which was normal.  She states they were looking for MS as her cousin has MS.  She is no longer having any tingling numbness/paresthesias, double vision.  No headaches.  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  She was treated.  She states she generally gets chest x-rays if there is a question.  Current Outpatient Medications   Medication Sig Dispense Refill    phentermine (ADIPEX-P) 37.5 MG tablet Take 1 tablet by mouth daily.      hydroCHLOROthiazide (HYDRODIURIL) 25 MG tablet Take 1 tablet by mouth daily Take 1 tablet daily       No current facility-administered medications for this visit.        No Known Allergies    Past Medical History:   Diagnosis Date    Hypertension     Left sided numbness     Resolved. was evaluated by Neurologist.     Numbness     left side of face and left body    TB lung, latent       Past Surgical History:   Procedure Laterality Date    ENDOMETRIAL ABLATION      ORTHOPEDIC SURGERY        Family History   Problem Relation Age of Onset    Pacemaker Mother     Atrial Fibrillation Mother     Diabetes Father     Parkinsonism Maternal Grandfather     Stroke Paternal Grandmother     Colon Cancer Paternal Grandmother         60

## 2025-02-03 ENCOUNTER — TELEPHONE (OUTPATIENT)
Facility: CLINIC | Age: 50
End: 2025-02-03

## 2025-02-03 PROCEDURE — 90715 TDAP VACCINE 7 YRS/> IM: CPT | Performed by: INTERNAL MEDICINE

## 2025-02-03 PROCEDURE — 90471 IMMUNIZATION ADMIN: CPT | Performed by: INTERNAL MEDICINE

## 2025-02-03 NOTE — PROGRESS NOTES
After verbal order read back of  Sofie Anthony MD, patient received TDAP in Left arm. Boostrix 0.5ml NDC 75901-731-48 lot [id number] exp 01/25/2027. Patient tolerated procedure without complaints and received VIS.

## 2025-02-03 NOTE — TELEPHONE ENCOUNTER
I called and verified the patient by name and date of birth, then proceeded with the message from the provider. The patient understood and had no questions at this time.

## 2025-02-25 RX ORDER — HYDROCHLOROTHIAZIDE 25 MG/1
25 TABLET ORAL DAILY
Qty: 90 TABLET | Refills: 1 | Status: SHIPPED | OUTPATIENT
Start: 2025-02-25

## 2025-02-25 NOTE — TELEPHONE ENCOUNTER
Future Appointments:  Future Appointments   Date Time Provider Department Center   2/2/2026  3:00 PM Sofie Anthony MD [facility] DE[facility]        Last Appointment With Me:  1/31/2025     Requested Prescriptions     Pending Prescriptions Disp Refills    hydroCHLOROthiazide (HYDRODIURIL) 25 MG tablet 90 tablet 1     Sig: Take 1 tablet by mouth daily Take 1 tablet daily